# Patient Record
Sex: FEMALE | Race: WHITE | NOT HISPANIC OR LATINO | URBAN - METROPOLITAN AREA
[De-identification: names, ages, dates, MRNs, and addresses within clinical notes are randomized per-mention and may not be internally consistent; named-entity substitution may affect disease eponyms.]

---

## 2024-02-06 ENCOUNTER — APPOINTMENT (OUTPATIENT)
Dept: URGENT CARE | Facility: MEDICAL CENTER | Age: 44
End: 2024-02-06

## 2024-02-06 ENCOUNTER — APPOINTMENT (OUTPATIENT)
Dept: LAB | Facility: MEDICAL CENTER | Age: 44
End: 2024-02-06

## 2024-02-06 DIAGNOSIS — Z02.1 PHYSICAL EXAM, PRE-EMPLOYMENT: ICD-10-CM

## 2024-02-06 LAB
MEV IGG SER QL IA: NORMAL
MUV IGG SER QL IA: NORMAL
RUBV IGG SERPL IA-ACNC: 421.1 IU/ML
VZV IGG SER QL IA: NORMAL

## 2024-02-06 PROCEDURE — 86765 RUBEOLA ANTIBODY: CPT

## 2024-02-06 PROCEDURE — 36415 COLL VENOUS BLD VENIPUNCTURE: CPT

## 2024-02-06 PROCEDURE — 86787 VARICELLA-ZOSTER ANTIBODY: CPT

## 2024-02-06 PROCEDURE — 86735 MUMPS ANTIBODY: CPT

## 2024-02-06 PROCEDURE — 86762 RUBELLA ANTIBODY: CPT

## 2024-03-12 ENCOUNTER — HOSPITAL ENCOUNTER (EMERGENCY)
Facility: HOSPITAL | Age: 44
Discharge: HOME | End: 2024-03-12
Attending: EMERGENCY MEDICINE | Admitting: EMERGENCY MEDICINE
Payer: OTHER MISCELLANEOUS

## 2024-03-12 VITALS
RESPIRATION RATE: 16 BRPM | DIASTOLIC BLOOD PRESSURE: 84 MMHG | OXYGEN SATURATION: 100 % | HEART RATE: 71 BPM | HEIGHT: 62 IN | SYSTOLIC BLOOD PRESSURE: 123 MMHG | BODY MASS INDEX: 28.52 KG/M2 | TEMPERATURE: 98.1 F | WEIGHT: 155 LBS

## 2024-03-12 DIAGNOSIS — T26.92XA CHEMICAL INJURY OF LEFT EYE: Primary | ICD-10-CM

## 2024-03-12 PROCEDURE — 99283 EMERGENCY DEPT VISIT LOW MDM: CPT

## 2024-03-12 PROCEDURE — 99284 EMERGENCY DEPT VISIT MOD MDM: CPT | Performed by: EMERGENCY MEDICINE

## 2024-03-12 RX ORDER — TETRACAINE HYDROCHLORIDE 5 MG/ML
2 SOLUTION OPHTHALMIC ONCE
Status: COMPLETED | OUTPATIENT
Start: 2024-03-12 | End: 2024-03-12

## 2024-03-12 RX ORDER — IBUPROFEN 600 MG/1
600 TABLET ORAL ONCE
Status: COMPLETED | OUTPATIENT
Start: 2024-03-12 | End: 2024-03-12

## 2024-03-12 RX ORDER — ACETAMINOPHEN 325 MG/1
975 TABLET ORAL ONCE
Status: COMPLETED | OUTPATIENT
Start: 2024-03-12 | End: 2024-03-12

## 2024-03-12 RX ADMIN — IBUPROFEN 600 MG: 600 TABLET, FILM COATED ORAL at 13:06

## 2024-03-12 RX ADMIN — ACETAMINOPHEN 975 MG: 325 TABLET, FILM COATED ORAL at 13:06

## 2024-03-12 RX ADMIN — TETRACAINE HYDROCHLORIDE 2 DROP: 5 SOLUTION OPHTHALMIC at 13:05

## 2024-03-12 RX ADMIN — FLUORESCEIN SODIUM 1 STRIP: 1 STRIP OPHTHALMIC at 13:06

## 2024-03-12 NOTE — ED PROVIDER NOTES
History  Chief Complaint   Patient presents with    Chemical Exposure     Pt works for [employer] & got oxide in her L eye. Pt reports she flushed it out but it hurts more. 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  She reports that only splash directly into her left eye, did not affect any other part of her body.  She reports that she has pain in the eye, and she reports that she has blurry vision but her vision is blurry in that eye at baseline and she just does not wear corrective lenses.  Patient denies any fever, headache, dizziness,, vomiting, cough, shortness of breath, chest pain, or other complaints.  The patient has no other medical problems, takes no medications, no allergies, does not drink, smoke, or use drugs.        None       History reviewed. No pertinent past medical history.    Past Surgical History:   Procedure Laterality Date     SECTION      CHOLECYSTECTOMY         History reviewed. No pertinent family history.  I have reviewed and agree with the history as documented.    E-Cigarette/Vaping     E-Cigarette/Vaping Substances     Social History     Tobacco Use    Smoking status: Never    Smokeless tobacco: Never       Review of Systems   Constitutional:  Negative for fever.   HENT:  Negative for congestion.    Eyes:  Positive for pain. Negative for visual disturbance.   Respiratory:  Negative for cough and shortness of breath.    Cardiovascular:  Negative for chest pain.   Gastrointestinal:  Negative for abdominal pain, constipation, diarrhea, nausea and vomiting.   Endocrine: Negative for polyuria.   Genitourinary:  Negative for dysuria and hematuria.   Musculoskeletal:  Negative for myalgias.   Neurological:  Negative for dizziness and headaches.       Physical Exam  Physical Exam  Vitals and nursing note reviewed.   Constitutional:       General:  She is not in acute distress.     Appearance: Normal appearance. She is well-developed.   HENT:      Head: Normocephalic and atraumatic.   Eyes:      Extraocular Movements: Extraocular movements intact.      Conjunctiva/sclera: Conjunctivae normal.      Comments: Patient testing shows a left eye pH of approximately 6.75 prior to any treatment.   Cardiovascular:      Rate and Rhythm: Normal rate.   Pulmonary:      Effort: Pulmonary effort is normal. No respiratory distress.   Abdominal:      General: Abdomen is flat. There is no distension.   Musculoskeletal:         General: No swelling.      Cervical back: Neck supple.   Skin:     General: Skin is warm and dry.      Capillary Refill: Capillary refill takes less than 2 seconds.   Neurological:      General: No focal deficit present.      Mental Status: She is alert and oriented to person, place, and time.   Psychiatric:         Mood and Affect: Mood normal.         Vital Signs  ED Triage Vitals   Temperature Pulse Respirations Blood Pressure SpO2   03/12/24 1028 03/12/24 1028 03/12/24 1028 03/12/24 1028 03/12/24 1028   98.1 °F (36.7 °C) 78 16 136/85 100 %      Temp Source Heart Rate Source Patient Position - Orthostatic VS BP Location FiO2 (%)   03/12/24 1028 03/12/24 1028 03/12/24 1028 03/12/24 1028 --   Oral Monitor Sitting Right arm       Pain Score       03/12/24 1306       5           Vitals:    03/12/24 1028 03/12/24 1449   BP: 136/85 123/84   Pulse: 78 71   Patient Position - Orthostatic VS: Sitting Sitting         Visual Acuity  Visual Acuity      Flowsheet Row Most Recent Value   Visual acuity R eye is 20/20   Visual acuity Left eye is 20/20   Visual acuity in both eyes is 20/20            ED Medications  Medications   fluorescein sodium sterile ophthalmic strip 1 strip (1 strip Left Eye Given by Other 3/12/24 1306)   tetracaine 0.5 % ophthalmic solution 2 drop (2 drops Left Eye Given by Other 3/12/24 1305)   acetaminophen (TYLENOL) tablet 975 mg (975 mg  Oral Given 3/12/24 1306)   ibuprofen (MOTRIN) tablet 600 mg (600 mg Oral Given 3/12/24 1306)       Diagnostic Studies  Results Reviewed       None                   No orders to display              Procedures  Procedures         ED Course       Medical Decision Making  Patient's eye was flushed with a Jaylon lens for her eye pain, and she was evaluated for possible corneal abrasion, ulceration, my high suspicion is the patient has chemical conjunctivitis.    After evaluation the patient's cornea is intact with no signs of fluorescein uptake and no evidence of foreign body under the lids, she will be discharged home and will be encouraged to follow-up with ophthalmology as an outpatient.  Repeat pH testing shows a pH of approximately 7.0.    Risk  OTC drugs.  Prescription drug management.             Disposition  Final diagnoses:   Chemical injury of left eye     Time reflects when diagnosis was documented in both MDM as applicable and the Disposition within this note       Time User Action Codes Description Comment    3/12/2024  2:26 PM Jitendra Cerna Add [T26.92XA] Chemical injury of left eye           ED Disposition       ED Disposition   Discharge    Condition   Stable    Date/Time   Tue Mar 12, 2024  2:26 PM    Comment   Evelyn Yeh discharge to home/self care.                   Follow-up Information       Follow up With Specialties Details Why Contact Info Additional Information    [facility] Emergency Department Emergency Medicine Go to  As needed [address] [facility] [id number] [phone] [facility] Emergency Department, [address] [place], [place], [address]    [facility] Family Medicine Schedule an appointment as soon as possible for a visit in 3 days For follow-up [address]  [facility] [address] [phone] [facility], [address], [place],  [address]   [phone]    Erik Zhu MD Ophthalmology Schedule an appointment as soon as possible for a visit in 1 day For follow-up [facility] [id number] [phone]               There are no discharge medications for this patient.      No discharge procedures on file.    PDMP Review       None            ED Provider  Electronically Signed by             Jitendra Cerna MD  03/15/24 [id number]

## 2024-03-26 ENCOUNTER — APPOINTMENT (OUTPATIENT)
Dept: URGENT CARE | Facility: MEDICAL CENTER | Age: 44
End: 2024-03-26

## 2024-06-04 ENCOUNTER — OFFICE VISIT (OUTPATIENT)
Dept: URGENT CARE | Facility: MEDICAL CENTER | Age: 44
End: 2024-06-04
Payer: COMMERCIAL

## 2024-06-04 VITALS
SYSTOLIC BLOOD PRESSURE: 127 MMHG | TEMPERATURE: 98.9 F | BODY MASS INDEX: 29.56 KG/M2 | HEART RATE: 79 BPM | RESPIRATION RATE: 20 BRPM | WEIGHT: 161.6 LBS | DIASTOLIC BLOOD PRESSURE: 70 MMHG

## 2024-06-04 DIAGNOSIS — M62.838 MUSCLE SPASM: Primary | ICD-10-CM

## 2024-06-04 PROCEDURE — 99213 OFFICE O/P EST LOW 20 MIN: CPT

## 2024-06-04 RX ORDER — METHOCARBAMOL 500 MG/1
500 TABLET, FILM COATED ORAL 4 TIMES DAILY
Qty: 20 TABLET | Refills: 0 | Status: SHIPPED | OUTPATIENT
Start: 2024-06-04

## 2024-06-04 NOTE — PATIENT INSTRUCTIONS
Gentle stretches as tolerated   Heat and/or ice for 20 minutes at a time to site of pain  Ibuprofen and Tylenol for pain as needed   Lidocaine patches to area   Massage  Robaxin as prescribed    Follow up with PCP in 3-5 days   Proceed to ER if worsening symptoms

## 2024-06-04 NOTE — PROGRESS NOTES
[facility]        NAME: Evelyn Yeh is a 43 y.o. female  : 1980    MRN: [id number]  DATE: 2024  TIME: 4:32 PM    Assessment and Plan   Muscle spasm [M62.838]  1. Muscle spasm  methocarbamol (ROBAXIN) 500 mg tablet        Muscle spasm to R thoracic back. Conservative management. Can consider PT if not improving.    Patient Instructions     Gentle stretches as tolerated   Heat and/or ice for 20 minutes at a time to site of pain  Ibuprofen and Tylenol for pain as needed   Lidocaine patches to area   Massage  Robaxin as prescribed    Follow up with PCP in 3-5 days   Proceed to ER if worsening symptoms     If tests are performed, our office will contact you with results only if changes need to made to the care plan discussed with you at the visit. [facility]    Chief Complaint     Chief Complaint   Patient presents with    Back Pain     Pt c/o upper back pain. States pain is worse when lyng down. Pain does not radiate per patient.          History of Present Illness       Back Pain  This is a new problem. The current episode started 1 to 4 weeks ago. The problem is unchanged. The pain is present in the thoracic spine. The quality of the pain is described as cramping and aching. The pain does not radiate. The pain is Worse during the night. The symptoms are aggravated by lying down and bending. Pertinent negatives include no abdominal pain, chest pain, dysuria, fever, headaches, leg pain, numbness, paresis, pelvic pain, tingling or weakness. She has tried NSAIDs for the symptoms. The treatment provided mild relief.       Review of Systems   Review of Systems   Constitutional:  Negative for chills and fever.   HENT:  Negative for congestion, postnasal drip, rhinorrhea, sinus pressure, sore throat and trouble swallowing.    Respiratory:  Negative for cough, chest tightness and shortness of breath.    Cardiovascular:  Negative for chest pain and  palpitations.   Gastrointestinal:  Negative for abdominal pain, nausea and vomiting.   Genitourinary:  Negative for difficulty urinating, dysuria and pelvic pain.   Musculoskeletal:  Positive for back pain. Negative for myalgias.   Neurological:  Negative for dizziness, tingling, weakness, numbness and headaches.         Current Medications       Current Outpatient Medications:     methocarbamol (ROBAXIN) 500 mg tablet, Take 1 tablet (500 mg total) by mouth 4 (four) times a day, Disp: 20 tablet, Rfl: 0    Current Allergies     Allergies as of 2024    (No Known Allergies)            The following portions of the patient's history were reviewed and updated as appropriate: allergies, current medications, past family history, past medical history, past social history, past surgical history and problem list.     History reviewed. No pertinent past medical history.    Past Surgical History:   Procedure Laterality Date     SECTION      CHOLECYSTECTOMY         History reviewed. No pertinent family history.      Medications have been verified.        Objective   /70   Pulse 79   Temp 98.9 °F (37.2 °C)   Resp 20   Wt 73.3 kg (161 lb 9.6 oz)   BMI 29.56 kg/m²        Physical Exam     Physical Exam  Constitutional:       General: She is not in acute distress.  HENT:      Head: Normocephalic.      Nose: Nose normal.   Eyes:      Pupils: Pupils are equal, round, and reactive to light.   Cardiovascular:      Rate and Rhythm: Normal rate and regular rhythm.      Pulses: Normal pulses.      Heart sounds: Normal heart sounds.   Pulmonary:      Effort: Pulmonary effort is normal.      Breath sounds: Normal breath sounds.   Abdominal:      General: Abdomen is flat.   Musculoskeletal:         General: Normal range of motion.      Cervical back: Normal.      Thoracic back: Spasms and tenderness present. No swelling, edema, deformity, signs of trauma, lacerations or bony tenderness. Normal range of motion.       Lumbar back: Normal.        Back:    Skin:     General: Skin is warm and dry.      Capillary Refill: Capillary refill takes less than 2 seconds.   Neurological:      Mental Status: She is alert and oriented to person, place, and time.

## 2024-06-24 ENCOUNTER — APPOINTMENT (OUTPATIENT)
Dept: LAB | Facility: CLINIC | Age: 44
End: 2024-06-24

## 2024-06-24 DIAGNOSIS — Z00.8 HEALTH EXAMINATION IN POPULATION SURVEY: ICD-10-CM

## 2024-06-24 LAB
CHOLEST SERPL-MCNC: 186 MG/DL
EST. AVERAGE GLUCOSE BLD GHB EST-MCNC: 114 MG/DL
HBA1C MFR BLD: 5.6 %
HDLC SERPL-MCNC: 51 MG/DL
LDLC SERPL CALC-MCNC: 90 MG/DL (ref 0–100)
NONHDLC SERPL-MCNC: 135 MG/DL
TRIGL SERPL-MCNC: 226 MG/DL

## 2024-06-24 PROCEDURE — 36415 COLL VENOUS BLD VENIPUNCTURE: CPT

## 2024-06-24 PROCEDURE — 80061 LIPID PANEL: CPT

## 2024-06-24 PROCEDURE — 83036 HEMOGLOBIN GLYCOSYLATED A1C: CPT

## 2024-06-26 ENCOUNTER — OFFICE VISIT (OUTPATIENT)
Dept: FAMILY MEDICINE CLINIC | Facility: CLINIC | Age: 44
End: 2024-06-26
Payer: COMMERCIAL

## 2024-06-26 VITALS
OXYGEN SATURATION: 98 % | WEIGHT: 158 LBS | BODY MASS INDEX: 29.08 KG/M2 | DIASTOLIC BLOOD PRESSURE: 74 MMHG | TEMPERATURE: 97.6 F | HEART RATE: 74 BPM | HEIGHT: 62 IN | SYSTOLIC BLOOD PRESSURE: 112 MMHG | RESPIRATION RATE: 16 BRPM

## 2024-06-26 DIAGNOSIS — K63.9 BOWEL TROUBLE: ICD-10-CM

## 2024-06-26 DIAGNOSIS — Z13.1 SCREENING FOR DIABETES MELLITUS: ICD-10-CM

## 2024-06-26 DIAGNOSIS — Z00.00 ANNUAL PHYSICAL EXAM: ICD-10-CM

## 2024-06-26 DIAGNOSIS — Z00.00 WELL ADULT EXAM: Primary | ICD-10-CM

## 2024-06-26 DIAGNOSIS — Z12.4 SCREENING FOR CERVICAL CANCER: ICD-10-CM

## 2024-06-26 DIAGNOSIS — L83 ACANTHOSIS NIGRICANS: ICD-10-CM

## 2024-06-26 DIAGNOSIS — Z13.0 SCREENING FOR DEFICIENCY ANEMIA: ICD-10-CM

## 2024-06-26 DIAGNOSIS — M89.8X1 PAIN OF RIGHT SCAPULA: ICD-10-CM

## 2024-06-26 DIAGNOSIS — Z13.29 SCREENING FOR THYROID DISORDER: ICD-10-CM

## 2024-06-26 DIAGNOSIS — Z12.31 ENCOUNTER FOR SCREENING MAMMOGRAM FOR MALIGNANT NEOPLASM OF BREAST: ICD-10-CM

## 2024-06-26 PROCEDURE — 99386 PREV VISIT NEW AGE 40-64: CPT | Performed by: PHYSICIAN ASSISTANT

## 2024-06-26 PROCEDURE — 99203 OFFICE O/P NEW LOW 30 MIN: CPT | Performed by: PHYSICIAN ASSISTANT

## 2024-06-26 RX ORDER — PREDNISONE 10 MG/1
10 TABLET ORAL 2 TIMES DAILY WITH MEALS
Qty: 10 TABLET | Refills: 0 | Status: SHIPPED | OUTPATIENT
Start: 2024-06-26

## 2024-06-26 RX ORDER — WHEAT DEXTRIN 3 G/3.8 G
POWDER (GRAM) ORAL DAILY
Start: 2024-06-26

## 2024-06-26 NOTE — PROGRESS NOTES
Adult Annual Physical  Name: Evelyn Yeh      : 1980      MRN: [id number]  Encounter Provider: Jackie Willoughby PA-C  Encounter Date: 2024   Encounter department: [facility]    Assessment & Plan   1. Well adult exam  2. Encounter for screening mammogram for malignant neoplasm of breast  -     Mammo screening bilateral w 3d & cad; Future  3. Screening for cervical cancer  -     Ambulatory Referral to Obstetrics / Gynecology; Future  4. Screening for thyroid disorder  -     TSH, 3rd generation with Free T4 reflex; Future  5. Screening for deficiency anemia  -     CBC and differential  6. Screening for diabetes mellitus  -     Comprehensive metabolic panel  7. Acanthosis nigricans  Comments:  Low-carb low sugar diet  8. Pain of right scapula  Comments:  [facility]  Orders:  -     predniSONE 10 mg tablet; Take 1 tablet (10 mg total) by mouth 2 (two) times a day with meals  9. Bowel trouble  Comments:  Add over-the-counter Benefiber  Orders:  -     Wheat Dextrin (Benefiber) POWD; Take by mouth daily  10. Annual physical exam    Immunizations and preventive care screenings were discussed with patient today. Appropriate education was printed on patient's after visit summary.    Counseling:  Exercise: the importance of regular exercise/physical activity was discussed. Recommend exercise 3-5 times per week for at least 30 minutes.       Depression Screening and Follow-up Plan: Patient was screened for depression during today's encounter. They screened negative with a PHQ-2 score of 0.    Patient presents as a new patient to establish.  She needs a physical.  Is also having several issues.  We had her screening for diabetes and cholesterol done.  Hemoglobin A1c is normal.  Lipid panel shows elevated triglycerides at 226.  On exam patient has acanthosis migrans.  Michelle low-carb low sugar diet and exercise.  Patient states her grandmother may have had diabetes.  Patient also has been having right  scapular pain.  Exam she has pain underneath the scapular region.  Is been present for a month.  No specific injury.  Relaxers no relief.  They also make her drowsy.  Takes Aleve with some relief.  Do a short course of prednisone.  Patient also has bowel issues.  Patient states she has intermittent constipation and diarrhea.  Have constipation.  Then have a bowel movement which is followed by 6 or more diarrhea episodes.  Michelle adding fiber every day.  Will also holding dairy to see if this affects her.  Labs were ordered.         History of Present Illness     Adult Annual Physical:  Patient presents for annual physical.     Diet and Physical Activity:  - Diet/Nutrition: well balanced diet.  - Exercise: no formal exercise.    Depression Screening:  - PHQ-2 Score: 0    General Health:  - Sleep: 7-8 hours of sleep on average.  - Hearing: normal hearing bilateral ears.  - Vision: wears glasses and most recent eye exam < 1 year ago.  - Dental: regular dental visits.    /GYN Health:  - Follows with GYN: no.   - Menopause: premenopausal.   - History of STDs: no  - Contraception:. tubal   ligation      Advanced Care Planning:  - Has an advanced directive?: no    - Has a durable medical POA?: no    - ACP document given to patient?: no      Review of Systems   Constitutional:  Negative for activity change, appetite change and unexpected weight change.   HENT:  Negative for ear pain and sore throat.    Eyes:  Negative for visual disturbance.   Respiratory:  Negative for cough, shortness of breath and wheezing.    Cardiovascular:  Negative for chest pain, palpitations and leg swelling.   Gastrointestinal:  Positive for constipation and diarrhea. Negative for abdominal pain, blood in stool, nausea and vomiting.        Bloating.   No   heartburn   Genitourinary:  Negative for difficulty urinating and menstrual problem.   Musculoskeletal:  Positive for back pain. Negative for arthralgias and myalgias.        Back  pain   for    "one  month   r ight   scapular  region.  Worse   when  lying.    Skin:  Negative for rash.        Itchy   ears   Neurological:  Negative for dizziness, syncope, light-headedness and headaches.   Psychiatric/Behavioral:  Negative for self-injury, sleep disturbance and suicidal ideas. The patient is not nervous/anxious.      Current Outpatient Medications on File Prior to Visit   Medication Sig Dispense Refill    [DISCONTINUED] methocarbamol (ROBAXIN) 500 mg tablet Take 1 tablet (500 mg total) by mouth 4 (four) times a day (Patient not taking: Reported on 6/26/2024) 20 tablet 0     No current facility-administered medications on file prior to visit.        Objective     /74 (BP Location: Right arm, Patient Position: Sitting, Cuff Size: Large)   Pulse 74   Temp 97.6 °F (36.4 °C) (Temporal)   Resp 16   Ht 5' 2\" (1.575 m)   Wt 71.7 kg (158 lb)   LMP 06/14/2024   SpO2 98%   BMI 28.90 kg/m²     Physical Exam  Vitals and nursing note reviewed.   Constitutional:       General: She is not in acute distress.     Appearance: Normal appearance. She is well-developed. She is not ill-appearing or diaphoretic.   HENT:      Head: Normocephalic and atraumatic.      Right Ear: Tympanic membrane, ear canal and external ear normal.      Left Ear: Tympanic membrane, ear canal and external ear normal.      Mouth/Throat:      Pharynx: No posterior oropharyngeal erythema.   Eyes:      Extraocular Movements: Extraocular movements intact.      Conjunctiva/sclera: Conjunctivae normal.      Pupils: Pupils are equal, round, and reactive to light.   Neck:      Thyroid: No thyromegaly.      Vascular: No carotid bruit.   Cardiovascular:      Rate and Rhythm: Normal rate and regular rhythm.      Heart sounds: Normal heart sounds. No murmur heard.     No friction rub.   Pulmonary:      Effort: Pulmonary effort is normal. No respiratory distress.      Breath sounds: Normal breath sounds. No wheezing.   Abdominal:      General: Bowel " sounds are normal. There is no distension.      Palpations: Abdomen is soft. There is no mass.      Tenderness: There is no abdominal tenderness.   Musculoskeletal:      Right lower leg: No edema.      Left lower leg: No edema.      Comments: Patient has tenderness of her right scapula.   Lymphadenopathy:      Cervical: No cervical adenopathy.   Skin:     General: Skin is warm and dry.      Comments: Acanthoses   nigrans   Neurological:      General: No focal deficit present.      Mental Status: She is alert and oriented to person, place, and time.   Psychiatric:         Mood and Affect: Mood normal.         Behavior: Behavior normal.         Thought Content: Thought content normal.         Judgment: Judgment normal.       Administrative Statements

## 2024-07-17 ENCOUNTER — OFFICE VISIT (OUTPATIENT)
Dept: URGENT CARE | Facility: MEDICAL CENTER | Age: 44
End: 2024-07-17
Payer: COMMERCIAL

## 2024-07-17 VITALS
DIASTOLIC BLOOD PRESSURE: 83 MMHG | TEMPERATURE: 98.1 F | RESPIRATION RATE: 18 BRPM | OXYGEN SATURATION: 98 % | HEART RATE: 72 BPM | BODY MASS INDEX: 29.26 KG/M2 | WEIGHT: 160 LBS | SYSTOLIC BLOOD PRESSURE: 122 MMHG

## 2024-07-17 DIAGNOSIS — L24.9 IRRITANT CONTACT DERMATITIS, UNSPECIFIED TRIGGER: Primary | ICD-10-CM

## 2024-07-17 PROCEDURE — 99213 OFFICE O/P EST LOW 20 MIN: CPT

## 2024-07-17 RX ORDER — METHYLPREDNISOLONE 4 MG/1
TABLET ORAL
Qty: 21 TABLET | Refills: 0 | Status: SHIPPED | OUTPATIENT
Start: 2024-07-17

## 2024-07-17 NOTE — PATIENT INSTRUCTIONS
Use benadryl cream to the area as needed for itching  Take the steroid dose pack as prescribed  Use topical hydrocortisone cream

## 2024-07-17 NOTE — PROGRESS NOTES
[facility]        NAME: Evelyn Yeh is a 44 y.o. female  : 1980    MRN: [id number]  DATE: 2024  TIME: 4:39 PM    Assessment and Plan   Irritant contact dermatitis, unspecified trigger [L24.9]  1. Irritant contact dermatitis, unspecified trigger  methylPREDNISolone 4 MG tablet therapy pack            Patient Instructions       Follow up with PCP in 3-5 days.  PUse benadryl cream to the area as needed for itching  Take the steroid dose pack as prescribed  Use topical hydrocortisone creamroceed to  ER if symptoms worsen.    If tests have been performed at [facility], our office will contact you with results if changes need to be made to the care plan discussed with you at the visit.  [facility]    Chief Complaint     Chief Complaint   Patient presents with    Rash     Pt. With a rash to her right arm that began a couple days ago.          History of Present Illness       This is a 44 year old female who presents with rash and itching on the R wrist and forearm.  Patient states she was working in the yard and developed the rash approximately a week ago.  She has been using hydrocortisone cream and benadryl cream. She states it is getting worse.  Areas are at the R wrist and R forearm, and R AC.          Review of Systems   Review of Systems   Constitutional: Negative.    HENT: Negative.     Respiratory: Negative.     Cardiovascular: Negative.    Gastrointestinal: Negative.    Genitourinary: Negative.    Skin:  Positive for rash (R AC, R wrist, R forearm).   Neurological: Negative.          Current Medications       Current Outpatient Medications:     methylPREDNISolone 4 MG tablet therapy pack, Use as directed on package, Disp: 21 tablet, Rfl: 0    Wheat Dextrin (Benefiber) POWD, Take by mouth daily, Disp: , Rfl:     predniSONE 10 mg tablet, Take 1 tablet (10 mg total) by mouth 2 (two) times a day with meals (Patient not taking: Reported on  2024), Disp: 10 tablet, Rfl: 0    Current Allergies     Allergies as of 2024    (No Known Allergies)            The following portions of the patient's history were reviewed and updated as appropriate: allergies, current medications, past family history, past medical history, past social history, past surgical history and problem list.     History reviewed. No pertinent past medical history.    Past Surgical History:   Procedure Laterality Date     SECTION      CHOLECYSTECTOMY         Family History   Problem Relation Age of Onset    Hypertension Mother          Medications have been verified.        Objective   /83   Pulse 72   Temp 98.1 °F (36.7 °C)   Resp 18   Wt 72.6 kg (160 lb)   LMP 2024   SpO2 98%   BMI 29.26 kg/m²   Patient's last menstrual period was 2024.       Physical Exam     Physical Exam  Constitutional:       Appearance: Normal appearance.   HENT:      Head: Normocephalic and atraumatic.      Right Ear: Tympanic membrane normal.      Left Ear: Tympanic membrane normal.      Nose: Nose normal.      Mouth/Throat:      Mouth: Mucous membranes are moist.      Pharynx: Oropharynx is clear.   Eyes:      Extraocular Movements: Extraocular movements intact.      Pupils: Pupils are equal, round, and reactive to light.   Cardiovascular:      Rate and Rhythm: Normal rate and regular rhythm.      Pulses: Normal pulses.      Heart sounds: Normal heart sounds.   Pulmonary:      Effort: Pulmonary effort is normal.      Breath sounds: Normal breath sounds.   Musculoskeletal:        Arms:       Cervical back: Normal range of motion and neck supple.      Comments: Small areas on R wrist and R AC and forearm   Skin:     General: Skin is warm.      Capillary Refill: Capillary refill takes less than 2 seconds.      Findings: Rash present.   Neurological:      General: No focal deficit present.      Mental Status: She is alert and oriented to person, place, and time.    Psychiatric:         Mood and Affect: Mood normal.         Thought Content: Thought content normal.         Judgment: Judgment normal.

## 2024-07-29 ENCOUNTER — OFFICE VISIT (OUTPATIENT)
Dept: URGENT CARE | Facility: MEDICAL CENTER | Age: 44
End: 2024-07-29
Payer: COMMERCIAL

## 2024-07-29 VITALS
SYSTOLIC BLOOD PRESSURE: 122 MMHG | OXYGEN SATURATION: 98 % | HEIGHT: 62 IN | WEIGHT: 160 LBS | HEART RATE: 80 BPM | RESPIRATION RATE: 20 BRPM | DIASTOLIC BLOOD PRESSURE: 72 MMHG | BODY MASS INDEX: 29.44 KG/M2 | TEMPERATURE: 98.9 F

## 2024-07-29 DIAGNOSIS — L30.9 DERMATITIS: Primary | ICD-10-CM

## 2024-07-29 PROCEDURE — 99213 OFFICE O/P EST LOW 20 MIN: CPT | Performed by: PHYSICIAN ASSISTANT

## 2024-07-29 RX ORDER — METHYLPREDNISOLONE 4 MG/1
TABLET ORAL
Qty: 21 TABLET | Refills: 0 | Status: SHIPPED | OUTPATIENT
Start: 2024-07-29

## 2024-07-29 NOTE — PROGRESS NOTES
[facility] Now        NAME: Evelyn Yeh is a 44 y.o. female  : 1980    MRN: [id number]  DATE: 2024  TIME: 10:56 AM    Assessment and Plan   Dermatitis [L30.9]  1. Dermatitis              Patient Instructions     Dermatitis  Medrol dose pack as directed  Follow up with dermatology  Follow up with PCP in 3-5 days.  Proceed to  ER if symptoms worsen.    Chief Complaint     Chief Complaint   Patient presents with    Rash     Patient states she was treated for dermatitis last week (finished course of steroids) and now rash is still spreading to the body, neck, and face          History of Present Illness       44-year-old female who presents complaining of itchy rash to bilateral upper extremities and neck.  Patient states that she was recently treated for poison ivy with steroids.  The rash had decreased but has now returned.  Denies swelling of lips/tongue, difficulty swallowing, shortness of breath.    Rash  Pertinent negatives include no cough or shortness of breath.       Review of Systems   Review of Systems   Constitutional: Negative.    HENT: Negative.     Eyes: Negative.    Respiratory: Negative.  Negative for cough, chest tightness, shortness of breath, wheezing and stridor.    Cardiovascular: Negative.  Negative for chest pain, palpitations and leg swelling.   Skin:  Positive for rash.         Current Medications       Current Outpatient Medications:     Wheat Dextrin (Benefiber) POWD, Take by mouth daily, Disp: , Rfl:     methylPREDNISolone 4 MG tablet therapy pack, Use as directed on package, Disp: 21 tablet, Rfl: 0    predniSONE 10 mg tablet, Take 1 tablet (10 mg total) by mouth 2 (two) times a day with meals (Patient not taking: Reported on 2024), Disp: 10 tablet, Rfl: 0    Current Allergies     Allergies as of 2024    (No Known Allergies)            The following portions of the patient's history were reviewed and updated as appropriate: allergies, current  "medications, past family history, past medical history, past social history, past surgical history and problem list.     History reviewed. No pertinent past medical history.    Past Surgical History:   Procedure Laterality Date     SECTION      CHOLECYSTECTOMY         Family History   Problem Relation Age of Onset    Hypertension Mother          Medications have been verified.        Objective   /72   Pulse 80   Temp 98.9 °F (37.2 °C) (Temporal)   Resp 20   Ht 5' 2\" (1.575 m)   Wt 72.6 kg (160 lb)   SpO2 98%   BMI 29.26 kg/m²        Physical Exam     Physical Exam  Constitutional:       Appearance: Normal appearance. She is well-developed.   HENT:      Head: Normocephalic and atraumatic.      Right Ear: External ear normal.      Left Ear: External ear normal.      Mouth/Throat:      Mouth: Oropharynx is clear and moist. Mucous membranes are moist.      Pharynx: Oropharynx is clear. No oropharyngeal exudate or posterior oropharyngeal erythema.   Cardiovascular:      Rate and Rhythm: Normal rate and regular rhythm.      Heart sounds: Normal heart sounds.   Pulmonary:      Effort: Pulmonary effort is normal. No respiratory distress.      Breath sounds: Normal breath sounds. No stridor. No wheezing, rhonchi or rales.   Chest:      Chest wall: No tenderness.   Musculoskeletal:      Cervical back: Normal range of motion and neck supple.   Lymphadenopathy:      Cervical: No cervical adenopathy.   Neurological:      Mental Status: She is alert.                   "

## 2024-07-29 NOTE — PATIENT INSTRUCTIONS
Dermatitis  Medrol dose pack as directed  Follow up with dermatology  Follow up with PCP in 3-5 days.  Proceed to  ER if symptoms worsen.

## 2024-09-06 ENCOUNTER — OFFICE VISIT (OUTPATIENT)
Dept: URGENT CARE | Facility: CLINIC | Age: 44
End: 2024-09-06
Payer: COMMERCIAL

## 2024-09-06 VITALS
SYSTOLIC BLOOD PRESSURE: 138 MMHG | DIASTOLIC BLOOD PRESSURE: 74 MMHG | TEMPERATURE: 98.1 F | OXYGEN SATURATION: 100 % | RESPIRATION RATE: 18 BRPM | HEART RATE: 72 BPM
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
## 2024-09-06 DIAGNOSIS — R22.42 LOCALIZED SWELLING OF LEFT FOOT: Primary | ICD-10-CM

## 2024-09-06 DIAGNOSIS — S90.862A INSECT BITE OF LEFT FOOT, INITIAL ENCOUNTER: ICD-10-CM

## 2024-09-06 PROBLEM — R51.9 CHRONIC RIGHT-SIDED HEADACHES: Status: ACTIVE | Noted: 2023-08-04

## 2024-09-06 PROBLEM — G89.29 CHRONIC RIGHT-SIDED HEADACHES: Status: ACTIVE | Noted: 2023-08-04

## 2024-09-06 PROCEDURE — 99213 OFFICE O/P EST LOW 20 MIN: CPT

## 2024-09-06 RX ORDER — TRIAMCINOLONE ACETONIDE 0.25 MG/G
CREAM TOPICAL 2 TIMES DAILY
Qty: 30 G | Refills: 0 | Status: SHIPPED | OUTPATIENT
Start: 2024-09-06

## 2024-09-06 NOTE — LETTER
September 6, 2024     Patient: Evelyn Yeh   YOB: 1980   Date of Visit: 9/6/2024       To Whom it May Concern:    Evelyn Yeh was seen in my clinic on 9/6/2024. She should be excused 9/7/24.     If you have any questions or concerns, please don't hesitate to call.         Sincerely,          ANDREA Almanza        CC: No Recipients

## 2024-09-06 NOTE — PATIENT INSTRUCTIONS
Start on pill antihistamine for 2-3 days : allergra or claritin  Take the steroid cream for the next 2-3 days twice a day.  Follow up is swelling worsens, fever or chills, increased redness, fever or chills

## 2024-09-06 NOTE — PROGRESS NOTES
[facility] Now        NAME: Evelyn Yeh is a 44 y.o. female  : 1980    MRN: [id number]  DATE: 2024  TIME: 6:26 PM    Assessment and Plan   Localized swelling of left foot [R22.42]  1. Localized swelling of left foot  triamcinolone (KENALOG) 0.025 % cream        Suspected bug bite to toe causing allergic reaction. Start on steroid cream and antihistamines to the site. With ice/epsom salt. No indications for trauma, does not appear infected, does not fit story of stress fracture with itchiness sensation.   Left elbow appears to be improving, can use steroid cream to the site.   Follow up with primary care in 3-5 days.  Go to ER if symptoms get worse.     Patient Instructions     Start on steroid cream and antihistamines to the site. With ice/epsom salt.   Left elbow appears to be improving, can use steroid cream to the site.   Go see your regular family doctor in 3-5 days.  Go to emergency room (ER) if you are getting worse.     Chief Complaint     Chief Complaint   Patient presents with    Toe Pain       Pt with left great toe pain/redness/swelling since today. Was initially itchy. Reports toe feels numb and has tingling going up left leg. Used cortisone crea.     Elbow Pain     Pt with lump on left elbow skin lump x2 days. Thought maybe poison ivy. Used calamine lotion.          History of Present Illness       Presents with two rashes. First to the left elbow. Thought potentially poison ivy. Applied calamine lotion. The area was itchy but that has resolved. Now hard lump present. Then felt itchiness to the left big toe. Then the area began with pain/redness/swelling. Reports toe feels numb and has tingling going up left leg. Used cortisone cream without relief. Denies injury, trauma, gout history. Area no longer itchy. She is able to walk but it is painful.         Review of Systems   Review of Systems   Constitutional:  Negative for fever.   Respiratory:  Negative for shortness  of breath.    Cardiovascular:  Negative for chest pain.   Musculoskeletal:         Painful ambulation   Skin:  Positive for rash.   Neurological:  Positive for numbness.         Current Medications       Current Outpatient Medications:     triamcinolone (KENALOG) 0.025 % cream, Apply topically 2 (two) times a day, Disp: 30 g, Rfl: 0    Wheat Dextrin (Benefiber) POWD, Take by mouth daily, Disp: , Rfl:     methylPREDNISolone 4 MG tablet therapy pack, Use as directed on package (Patient not taking: Reported on 2024), Disp: 21 tablet, Rfl: 0    methylPREDNISolone 4 MG tablet therapy pack, Use as directed on package (Patient not taking: Reported on 2024), Disp: 21 tablet, Rfl: 0    predniSONE 10 mg tablet, Take 1 tablet (10 mg total) by mouth 2 (two) times a day with meals (Patient not taking: Reported on 2024), Disp: 10 tablet, Rfl: 0    Current Allergies     Allergies as of 2024    (No Known Allergies)            The following portions of the patient's history were reviewed and updated as appropriate: allergies, current medications, past family history, past medical history, past social history, past surgical history and problem list.     History reviewed. No pertinent past medical history.    Past Surgical History:   Procedure Laterality Date     SECTION      CHOLECYSTECTOMY         Family History   Problem Relation Age of Onset    Hypertension Mother          Medications have been verified.        Objective   /74   Pulse 72   Temp 98.1 °F (36.7 °C)   Resp 18   SpO2 100%        Physical Exam     Physical Exam  Vitals reviewed.   Constitutional:       Appearance: Normal appearance.   Cardiovascular:      Rate and Rhythm: Normal rate and regular rhythm.      Pulses: Normal pulses.      Heart sounds: Normal heart sounds. No murmur heard.  Pulmonary:      Effort: Pulmonary effort is normal. No respiratory distress.      Breath sounds: Normal breath sounds.   Feet:      Comments: Left  foot at base of toe with moderate swelling and erythema. Erythema is pale, puffy edema. No streaking. No warmth. Over the base of the top with deeper redness present, and mild open skin.   Left elbow with keloid hard area present about 1 cm x 0.25 cm, raised from skin. No localized erythema.   Skin:     General: Skin is warm and dry.      Capillary Refill: Capillary refill takes less than 2 seconds.   Neurological:      General: No focal deficit present.      Mental Status: She is alert and oriented to person, place, and time.   Psychiatric:         Mood and Affect: Mood normal.         Behavior: Behavior normal.

## 2024-09-06 NOTE — PROGRESS NOTES
Subjective:      Evelyn Yeh is a 44 y.o. female. Here for Gynecologic Exam (Pap collected today /Mammo 10/26/24 / scheduled /Birth control- patient has tubal /Std testing declined /Gardasil )      GYN HPI  Menstrual cycle:  Patient denies menstrual complaints. Regular monthly menses, not excessive  can sometimes be closer by a week.  Vaginal c/o: denies  Urinary c/o: denies  Breast complaints:denies  She does not do self breast Exams    Sexually active: yes,  x 22 yrs.   Denies c/o r/t Sexual activity   Contraception: TL  She reports she feels safe at home.     Dietary calcium/vit D  intake: adequate  Lifestyle: occasional exercises    HEALTH MAINTENANCE SCREENINGS:    Last Papanicolaou test:  Not on file   History of abnormal pap: No  Last mammogram:  10/20/2022      Hereditary Cancer Screening  Cancer-related family history is not on file.       Substance Abuse Screening Completed. See hx and flowsheet.    The following portions of the patient's history were reviewed and updated as appropriate: allergies, current medications, past family history, past medical history, past social history, past surgical history, and problem list.         Review of Systems   Constitutional:  Negative for appetite change, chills, fatigue, fever and unexpected weight change.   HENT: Negative.     Eyes: Negative.    Respiratory:  Negative for chest tightness and shortness of breath.    Cardiovascular:  Negative for chest pain and palpitations.   Gastrointestinal:  Negative for abdominal pain, constipation and vomiting.   Endocrine: Negative for cold intolerance and heat intolerance.   Genitourinary:         As per HPI   Musculoskeletal:  Negative for back pain, joint swelling and neck pain.   Skin:  Negative for color change and rash.   Neurological:  Negative for dizziness, weakness and numbness.   Hematological:  Does not bruise/bleed easily.   Psychiatric/Behavioral: Negative.               Objective:  /80 (BP  "Location: Left arm, Patient Position: Sitting, Cuff Size: Standard)   Ht 5' 2\" (1.575 m)   Wt 71.2 kg (157 lb)   LMP 09/02/2024 (Exact Date)   BMI 28.72 kg/m²        Physical Exam  Constitutional:       General: She is not in acute distress.     Appearance: Normal appearance.   Genitourinary:      Vulva and rectum normal.      No lesions in the vagina.      Right Labia: No rash or lesions.     Left Labia: No lesions or rash.     No vaginal discharge, erythema, tenderness or bleeding.        Right Adnexa: not tender and no mass present.     Left Adnexa: not tender and no mass present.     No cervical motion tenderness, discharge or friability.      Uterus is not enlarged or tender.      No urethral prolapse present.      Pelvic exam was performed with patient in the lithotomy position.   Breasts:     Breasts are symmetrical.      Right: No inverted nipple, mass, nipple discharge, skin change or tenderness.      Left: No inverted nipple, mass, nipple discharge, skin change or tenderness.   HENT:      Head: Normocephalic and atraumatic.   Cardiovascular:      Rate and Rhythm: Normal rate.      Heart sounds: No murmur heard.  Pulmonary:      Effort: Pulmonary effort is normal.      Breath sounds: Normal breath sounds.   Abdominal:      General: There is no distension.      Palpations: Abdomen is soft.      Tenderness: There is no abdominal tenderness.   Musculoskeletal:         General: Normal range of motion.      Cervical back: Normal range of motion.   Lymphadenopathy:      Cervical: No cervical adenopathy.   Neurological:      Mental Status: She is alert and oriented to person, place, and time.   Skin:     General: Skin is warm and dry.   Psychiatric:         Mood and Affect: Mood normal.         Behavior: Behavior normal.   Vitals reviewed.             Assessment/Plan:           ANNUAL GYN EXAM- Primary  Annual GYN examination completed today.   Health maintenance reviewed/updated as appropriate  Cervical " cancer screen: Previous pap smears and ASCCP screening guidelines have been reviewed. Pap collected.  Breast Health: Encouraged regular self breast exams. Mammo is scheduled for today. .    Risk prevention and anticipatory guidance provided including:  Age related Calcium and vitamin D intake  Dietary and lifestyle recommendations based on her age and weight. body mass index is 28.72 kg/m²..    Tobacco and alcohol use, intervention ordered if applicable.   Condom use for prevention of STI's. declined STI Screening.  Contraception:  N/A- s/p permanent sterilization surgery    Problem List Items Addressed This Visit    None  Visit Diagnoses       Encounter for gynecological examination (general) (routine) without abnormal findings    -  Primary    Screening for cervical cancer        Relevant Orders    Liquid-based pap, screening    Encounter for screening mammogram for breast cancer                No orders of the defined types were placed in this encounter.

## 2024-09-09 ENCOUNTER — HOSPITAL ENCOUNTER (OUTPATIENT)
Age: 44
Discharge: HOME | End: 2024-09-09
Payer: COMMERCIAL

## 2024-09-09 ENCOUNTER — CONSULT (OUTPATIENT)
Dept: OBGYN CLINIC | Facility: MEDICAL CENTER | Age: 44
End: 2024-09-09
Payer: COMMERCIAL

## 2024-09-09 VITALS
DIASTOLIC BLOOD PRESSURE: 80 MMHG | WEIGHT: 157 LBS | SYSTOLIC BLOOD PRESSURE: 118 MMHG | HEIGHT: 62 IN | BODY MASS INDEX: 28.89 KG/M2

## 2024-09-09 DIAGNOSIS — Z12.31 ENCOUNTER FOR SCREENING MAMMOGRAM FOR MALIGNANT NEOPLASM OF BREAST: ICD-10-CM

## 2024-09-09 DIAGNOSIS — Z12.4 SCREENING FOR CERVICAL CANCER: ICD-10-CM

## 2024-09-09 DIAGNOSIS — Z12.31 ENCOUNTER FOR SCREENING MAMMOGRAM FOR BREAST CANCER: ICD-10-CM

## 2024-09-09 DIAGNOSIS — Z01.419 ENCOUNTER FOR GYNECOLOGICAL EXAMINATION (GENERAL) (ROUTINE) WITHOUT ABNORMAL FINDINGS: Primary | ICD-10-CM

## 2024-09-09 PROCEDURE — 77063 BREAST TOMOSYNTHESIS BI: CPT

## 2024-09-09 PROCEDURE — S0610 ANNUAL GYNECOLOGICAL EXAMINA: HCPCS | Performed by: CLINICAL NURSE SPECIALIST

## 2024-09-09 PROCEDURE — 77067 SCR MAMMO BI INCL CAD: CPT

## 2024-09-09 PROCEDURE — G0476 HPV COMBO ASSAY CA SCREEN: HCPCS | Performed by: CLINICAL NURSE SPECIALIST

## 2024-09-09 PROCEDURE — G0145 SCR C/V CYTO,THINLAYER,RESCR: HCPCS | Performed by: CLINICAL NURSE SPECIALIST

## 2024-09-10 ENCOUNTER — TELEPHONE (OUTPATIENT)
Age: 44
End: 2024-09-10

## 2024-09-10 LAB
HPV HR 12 DNA CVX QL NAA+PROBE: NEGATIVE
HPV16 DNA CVX QL NAA+PROBE: NEGATIVE
HPV18 DNA CVX QL NAA+PROBE: NEGATIVE

## 2024-09-10 NOTE — TELEPHONE ENCOUNTER
Pt calling for NP appt for rash, advised next avail, pt wants to be seen ASAP, advised to call for cancellations periodically

## 2024-09-12 LAB
LAB AP GYN PRIMARY INTERPRETATION: NORMAL
Lab: NORMAL

## 2024-09-16 ENCOUNTER — OFFICE VISIT (OUTPATIENT)
Dept: URGENT CARE | Facility: MEDICAL CENTER | Age: 44
End: 2024-09-16
Payer: COMMERCIAL

## 2024-09-16 VITALS
TEMPERATURE: 97.8 F | DIASTOLIC BLOOD PRESSURE: 70 MMHG | HEIGHT: 62 IN | OXYGEN SATURATION: 99 % | BODY MASS INDEX: 29.74 KG/M2 | HEART RATE: 80 BPM | WEIGHT: 161.6 LBS | SYSTOLIC BLOOD PRESSURE: 125 MMHG | RESPIRATION RATE: 16 BRPM

## 2024-09-16 DIAGNOSIS — L23.7 POISON IVY DERMATITIS: Primary | ICD-10-CM

## 2024-09-16 PROCEDURE — 99213 OFFICE O/P EST LOW 20 MIN: CPT | Performed by: PHYSICIAN ASSISTANT

## 2024-09-16 RX ORDER — METHYLPREDNISOLONE 4 MG
TABLET, DOSE PACK ORAL
Qty: 21 TABLET | Refills: 0 | Status: SHIPPED | OUTPATIENT
Start: 2024-09-16

## 2024-09-16 RX ORDER — MOMETASONE FUROATE 1 MG/G
CREAM TOPICAL DAILY
Qty: 45 G | Refills: 0 | Status: SHIPPED | OUTPATIENT
Start: 2024-09-16

## 2024-09-16 NOTE — PROGRESS NOTES
[facility] Now        NAME: Evelyn Yeh is a 44 y.o. female  : 1980    MRN: [id number]  DATE: 2024  TIME: 4:37 PM    Assessment and Plan   Poison ivy dermatitis [L23.7]  1. Poison ivy dermatitis  methylPREDNISolone 4 MG tablet therapy pack    mometasone (ELOCON) 0.1 % cream            Patient Instructions     Poison ivy dermatitis  Medrol Dosepak as directed  Elocon cream as directed  Follow up with PCP in 3-5 days.  Proceed to  ER if symptoms worsen.    Chief Complaint     Chief Complaint   Patient presents with    Poison Ivy     Started with rash 2 days ago- b/l arms, back, torso, and neck. Calamine lotion as needed.         History of Present Illness       44-year-old female who presents complaining of itchy rash to bilateral upper extremities.  Patient states that she has had similar symptoms in the past with previous episodes of poison ivy.  Has been applying over-the-counter medication with no relief.    Poison Mary  Pertinent negatives include no anorexia, congestion, cough, diarrhea, eye pain, facial edema, fatigue, fever, joint pain, rhinorrhea, shortness of breath, sore throat or vomiting.   Rash  This is a recurrent problem. The current episode started yesterday. The problem has been rapidly worsening since onset. The affected locations include the left arm and right arm. The rash is characterized by blistering, burning, pain, redness, swelling, bruising and itchiness. Pertinent negatives include no anorexia, congestion, cough, diarrhea, facial edema, fatigue, fever, joint pain, rhinorrhea, shortness of breath, sore throat or vomiting.       Review of Systems   Review of Systems   Constitutional:  Negative for fatigue and fever.   HENT:  Negative for congestion, rhinorrhea and sore throat.    Eyes:  Negative for pain.   Respiratory:  Negative for cough and shortness of breath.    Gastrointestinal:  Negative for anorexia, diarrhea and vomiting.   Musculoskeletal:   "Negative for joint pain.   Skin:  Positive for rash.         Current Medications       Current Outpatient Medications:     methylPREDNISolone 4 MG tablet therapy pack, Use as directed on package, Disp: 21 tablet, Rfl: 0    mometasone (ELOCON) 0.1 % cream, Apply topically daily, Disp: 45 g, Rfl: 0    Wheat Dextrin (Benefiber) POWD, Take by mouth daily, Disp: , Rfl:     triamcinolone (KENALOG) 0.025 % cream, Apply topically 2 (two) times a day (Patient not taking: Reported on 2024), Disp: 30 g, Rfl: 0    Current Allergies     Allergies as of 2024    (No Known Allergies)            The following portions of the patient's history were reviewed and updated as appropriate: allergies, current medications, past family history, past medical history, past social history, past surgical history and problem list.     History reviewed. No pertinent past medical history.    Past Surgical History:   Procedure Laterality Date     SECTION      CHOLECYSTECTOMY      TUBAL LIGATION         Family History   Problem Relation Age of Onset    Hypertension Mother     No Known Problems Daughter     No Known Problems Maternal Grandmother     No Known Problems Paternal Grandmother     No Known Problems Paternal Aunt     Breast cancer Neg Hx          Medications have been verified.        Objective   /70   Pulse 80   Temp 97.8 °F (36.6 °C)   Resp 16   Ht 5' 2\" (1.575 m)   Wt 73.3 kg (161 lb 9.6 oz)   LMP 2024 (Exact Date)   SpO2 99%   BMI 29.56 kg/m²        Physical Exam     Physical Exam  Constitutional:       Appearance: Normal appearance. She is well-developed.   HENT:      Head: Normocephalic and atraumatic.      Right Ear: External ear normal.      Left Ear: External ear normal.      Mouth/Throat:      Mouth: Oropharynx is clear and moist.   Cardiovascular:      Rate and Rhythm: Normal rate and regular rhythm.      Heart sounds: Normal heart sounds.   Pulmonary:      Effort: Pulmonary effort is normal. " No respiratory distress.      Breath sounds: Normal breath sounds. No wheezing or rales.   Chest:      Chest wall: No tenderness.   Musculoskeletal:      Cervical back: Normal range of motion and neck supple.   Lymphadenopathy:      Cervical: No cervical adenopathy.   Skin:         Neurological:      Mental Status: She is alert.                   Answers submitted by the patient for this visit:  Rash Questionnaire (Submitted on 9/16/2024)  Chief Complaint: Rash  nail changes: No

## 2024-10-02 ENCOUNTER — OFFICE VISIT (OUTPATIENT)
Dept: FAMILY MEDICINE CLINIC | Facility: CLINIC | Age: 44
End: 2024-10-02
Payer: COMMERCIAL

## 2024-10-02 ENCOUNTER — APPOINTMENT (OUTPATIENT)
Dept: RADIOLOGY | Facility: MEDICAL CENTER | Age: 44
End: 2024-10-02
Payer: COMMERCIAL

## 2024-10-02 VITALS
OXYGEN SATURATION: 98 % | RESPIRATION RATE: 16 BRPM | TEMPERATURE: 98.3 F | WEIGHT: 160 LBS | SYSTOLIC BLOOD PRESSURE: 104 MMHG | HEART RATE: 76 BPM | HEIGHT: 62 IN | BODY MASS INDEX: 29.44 KG/M2 | DIASTOLIC BLOOD PRESSURE: 72 MMHG

## 2024-10-02 DIAGNOSIS — M54.6 ACUTE RIGHT-SIDED THORACIC BACK PAIN: ICD-10-CM

## 2024-10-02 DIAGNOSIS — M54.6 ACUTE RIGHT-SIDED THORACIC BACK PAIN: Primary | ICD-10-CM

## 2024-10-02 DIAGNOSIS — M89.8X1 PAIN OF RIGHT SCAPULA: ICD-10-CM

## 2024-10-02 PROCEDURE — 99213 OFFICE O/P EST LOW 20 MIN: CPT | Performed by: INTERNAL MEDICINE

## 2024-10-02 PROCEDURE — 71046 X-RAY EXAM CHEST 2 VIEWS: CPT

## 2024-10-02 PROCEDURE — 72072 X-RAY EXAM THORAC SPINE 3VWS: CPT

## 2024-10-02 RX ORDER — LIDOCAINE 40 MG/G
CREAM TOPICAL
Qty: 60 G | Refills: 1 | Status: SHIPPED | OUTPATIENT
Start: 2024-10-02

## 2024-10-02 RX ORDER — NAPROXEN 500 MG/1
500 TABLET ORAL 2 TIMES DAILY WITH MEALS
Qty: 60 TABLET | Refills: 5 | Status: SHIPPED | OUTPATIENT
Start: 2024-10-02

## 2024-10-02 NOTE — PROGRESS NOTES
Ambulatory Visit  Name: Evelyn Yeh      : 1980      MRN: [id number]  Encounter Provider: Trudy Negro MD  Encounter Date: 10/2/2024   Encounter department: [facility]    Assessment & Plan  Acute right-sided thoracic back pain    Orders:    XR spine thoracic 3 vw; Future    XR chest pa and lateral; Future    naproxen (Naprosyn) 500 mg tablet; Take 1 tablet (500 mg total) by mouth 2 (two) times a day with meals    lidocaine (LMX) 4 % cream; Apply topically daily at bedtime    Pain of right scapula  Patient complains of mid back pain near her spine and her scapula on the right. She denies any trauma shortness of breath cough fever chills will try steroid pack along with Naprosyn and get some x-rays            History of Present Illness     Back Pain  This is a new problem. The current episode started more than 1 month ago. The problem occurs daily. The problem is unchanged. The pain is present in the thoracic spine. The quality of the pain is described as stabbing and aching. The pain does not radiate. The pain is at a severity of 6/10. The pain is moderate. The pain is The same all the time. The symptoms are aggravated by lying down, position and twisting. Stiffness is present: none. Pertinent negatives include no abdominal pain, bowel incontinence, chest pain, dysuria, fever, numbness or paresthesias. Risk factors: none. She has tried analgesics and heat for the symptoms. The treatment provided no relief.         Review of Systems   Constitutional:  Negative for chills and fever.   HENT:  Negative for ear pain and sore throat.    Eyes:  Negative for pain and visual disturbance.   Respiratory:  Negative for cough, chest tightness, shortness of breath and wheezing.    Cardiovascular:  Negative for chest pain, palpitations and leg swelling.   Gastrointestinal:  Negative for abdominal pain, bowel incontinence and vomiting.   Genitourinary:  Negative for dysuria and hematuria.  "  Musculoskeletal:  Positive for back pain. Negative for arthralgias.   Skin:  Negative for color change and rash.   Neurological:  Negative for seizures, syncope, numbness and paresthesias.   All other systems reviewed and are negative.          Objective     /72 (BP Location: Left arm, Patient Position: Sitting, Cuff Size: Large)   Pulse 76   Temp 98.3 °F (36.8 °C) (Temporal)   Resp 16   Ht 5' 2\" (1.575 m)   Wt 72.6 kg (160 lb)   LMP 09/27/2024 (Approximate)   SpO2 98%   BMI 29.26 kg/m²     Physical Exam  Constitutional:       Appearance: Normal appearance.   HENT:      Head: Normocephalic.      Nose: Nose normal.      Mouth/Throat:      Mouth: Mucous membranes are moist.   Cardiovascular:      Rate and Rhythm: Normal rate and regular rhythm.   Pulmonary:      Effort: Pulmonary effort is normal.      Breath sounds: Normal breath sounds.   Abdominal:      General: Abdomen is flat.      Palpations: Abdomen is soft.   Musculoskeletal:      Cervical back: No rigidity or tenderness.      Right lower leg: No edema.      Left lower leg: No edema.   Lymphadenopathy:      Cervical: No cervical adenopathy.   Skin:     General: Skin is warm and dry.      Capillary Refill: Capillary refill takes less than 2 seconds.      Findings: No rash.   Neurological:      General: No focal deficit present.      Mental Status: She is alert and oriented to person, place, and time. Mental status is at baseline.   Psychiatric:         Mood and Affect: Mood normal.         Behavior: Behavior normal.         Thought Content: Thought content normal.         Judgment: Judgment normal.         "

## 2024-10-02 NOTE — ASSESSMENT & PLAN NOTE
Patient complains of mid back pain near her spine and her scapula on the right. She denies any trauma shortness of breath cough fever chills will try steroid pack along with Naprosyn and get some x-rays

## 2025-01-06 ENCOUNTER — OFFICE VISIT (OUTPATIENT)
Dept: URGENT CARE | Facility: CLINIC | Age: 45
End: 2025-01-06
Payer: COMMERCIAL

## 2025-01-06 VITALS
HEART RATE: 120 BPM | TEMPERATURE: 101.6 F | OXYGEN SATURATION: 96 % | RESPIRATION RATE: 18 BRPM | SYSTOLIC BLOOD PRESSURE: 140 MMHG | DIASTOLIC BLOOD PRESSURE: 80 MMHG

## 2025-01-06 DIAGNOSIS — J02.9 SORE THROAT: ICD-10-CM

## 2025-01-06 DIAGNOSIS — R68.89 FLU-LIKE SYMPTOMS: Primary | ICD-10-CM

## 2025-01-06 DIAGNOSIS — R05.1 ACUTE COUGH: ICD-10-CM

## 2025-01-06 DIAGNOSIS — R11.2 NAUSEA AND VOMITING, UNSPECIFIED VOMITING TYPE: ICD-10-CM

## 2025-01-06 DIAGNOSIS — R50.9 FEVER, UNSPECIFIED FEVER CAUSE: ICD-10-CM

## 2025-01-06 LAB — S PYO AG THROAT QL: NEGATIVE

## 2025-01-06 PROCEDURE — 87880 STREP A ASSAY W/OPTIC: CPT

## 2025-01-06 PROCEDURE — 99213 OFFICE O/P EST LOW 20 MIN: CPT

## 2025-01-06 PROCEDURE — 87636 SARSCOV2 & INF A&B AMP PRB: CPT

## 2025-01-06 RX ORDER — ONDANSETRON 4 MG/1
4 TABLET, ORALLY DISINTEGRATING ORAL ONCE
Status: COMPLETED | OUTPATIENT
Start: 2025-01-06 | End: 2025-01-06

## 2025-01-06 RX ORDER — BENZONATATE 200 MG/1
200 CAPSULE ORAL 3 TIMES DAILY PRN
Qty: 20 CAPSULE | Refills: 0 | Status: SHIPPED | OUTPATIENT
Start: 2025-01-06

## 2025-01-06 RX ADMIN — ONDANSETRON 4 MG: 4 TABLET, ORALLY DISINTEGRATING ORAL at 10:41

## 2025-01-06 NOTE — PROGRESS NOTES
[facility]        NAME: Evelyn Yeh is a 44 y.o. female  : 1980    MRN: [id number]  DATE: 2025  TIME: 11:14 AM    Assessment and Plan   Flu-like symptoms [R68.89]  1. Flu-like symptoms        2. Sore throat  POCT rapid strepA      3. Fever, unspecified fever cause  COVID/FLU    COVID/FLU      4. Nausea and vomiting, unspecified vomiting type  ondansetron (ZOFRAN-ODT) dispersible tablet 4 mg      5. Acute cough  benzonatate (TESSALON) 200 MG capsule        Rapid strep negative.  PCR COVID/flu obtained. Await results.     Patient Instructions     Check my chart for COVID/flu results.   Vitamin D3 2000 IU daily.  Vitamin C 1000mg twice per day.  Multivitamin daily.  Some studies suggest that Zinc 12.5-15mg every 2 hours while awake x 5 days may shorten the duration cold symptoms by 1-2 days.   Fluids and rest.  Nasal saline spray; Afrin if severe congestion (do not use for more than 3 days)  Over the counter cough/cold medications as needed.   Flonase nasal spray.  Tylenol/Ibuprofen for pain/fever.  Salt water gargles and/or chloraseptic spray.  Warm tea with honey.  Warm compresses over sinuses.  Nasal rinses with distilled water.     Follow up with PCP if symptoms persist past 10-14 days.  Proceed to the ER with worsening symptoms.     Chief Complaint     Chief Complaint   Patient presents with    Cold Like Symptoms     C/o cough, SOB, body aches, sore throat, h/a, fever of 102, and N/V.    Taking day quill and nyquill. Last dose of medication last night. Symptoms started on Sat.          History of Present Illness       The patient presents today with complaints of fever/chills (Tmax 102 at home), n/v, nasal congestion, body aches, dry cough, SOB, chest tightness, sore throat, headache that started on Sat. She has been taking dayquil/nyquil as needed with minimal relief, as well as tylenol/motrin. Last dose was around 3 am this morning.         Review of Systems   Review of  Systems   Constitutional:  Positive for appetite change (decreased), chills, fatigue and fever.   HENT:  Positive for congestion, ear pain (right), postnasal drip, rhinorrhea and sore throat. Negative for sinus pressure and sinus pain.    Respiratory:  Positive for cough and shortness of breath.    Gastrointestinal:  Positive for nausea and vomiting. Negative for abdominal pain and diarrhea.   Genitourinary:  Negative for difficulty urinating.   Musculoskeletal:  Positive for myalgias.   Skin:  Negative for rash.   Neurological:  Positive for headaches.         Current Medications       Current Outpatient Medications:     benzonatate (TESSALON) 200 MG capsule, Take 1 capsule (200 mg total) by mouth 3 (three) times a day as needed for cough, Disp: 20 capsule, Rfl: 0    lidocaine (LMX) 4 % cream, Apply topically daily at bedtime (Patient not taking: Reported on 2025), Disp: 60 g, Rfl: 1    naproxen (Naprosyn) 500 mg tablet, Take 1 tablet (500 mg total) by mouth 2 (two) times a day with meals (Patient not taking: Reported on 2025), Disp: 60 tablet, Rfl: 5    Wheat Dextrin (Benefiber) POWD, Take by mouth daily (Patient not taking: Reported on 2025), Disp: , Rfl:   No current facility-administered medications for this visit.    Current Allergies     Allergies as of 2025    (No Known Allergies)            The following portions of the patient's history were reviewed and updated as appropriate: allergies, current medications, past family history, past medical history, past social history, past surgical history and problem list.     History reviewed. No pertinent past medical history.    Past Surgical History:   Procedure Laterality Date     SECTION      CHOLECYSTECTOMY      TUBAL LIGATION         Family History   Problem Relation Age of Onset    Hypertension Mother     No Known Problems Daughter     No Known Problems Maternal Grandmother     No Known Problems Paternal Grandmother     No Known  Problems Paternal Aunt     Breast cancer Neg Hx          Medications have been verified.        Objective   /80   Pulse (!) 120   Temp (!) 101.6 °F (38.7 °C)   Resp 18   SpO2 96%        Physical Exam     Physical Exam  Vitals and nursing note reviewed.   Constitutional:       General: She is not in acute distress.     Appearance: Normal appearance. She is ill-appearing.   HENT:      Head: Normocephalic and atraumatic.      Right Ear: Tympanic membrane, ear canal and external ear normal.      Left Ear: Tympanic membrane, ear canal and external ear normal.      Nose: Congestion present. No rhinorrhea.      Mouth/Throat:      Lips: Pink.      Mouth: Mucous membranes are moist.      Pharynx: No oropharyngeal exudate or posterior oropharyngeal erythema.      Tonsils: No tonsillar exudate.   Eyes:      General: Vision grossly intact.      Extraocular Movements: Extraocular movements intact.      Pupils: Pupils are equal, round, and reactive to light.   Cardiovascular:      Rate and Rhythm: Regular rhythm. Tachycardia present.      Heart sounds: Normal heart sounds. No murmur heard.  Pulmonary:      Effort: Pulmonary effort is normal. No respiratory distress.      Breath sounds: Normal breath sounds. No decreased air movement. No decreased breath sounds, wheezing, rhonchi or rales.   Abdominal:      Palpations: Abdomen is soft.      Tenderness: There is no abdominal tenderness.   Musculoskeletal:         General: Normal range of motion.      Cervical back: Normal range of motion.   Lymphadenopathy:      Cervical: No cervical adenopathy.   Skin:     General: Skin is warm.      Findings: No rash.   Neurological:      Mental Status: She is alert and oriented to person, place, and time.      Motor: Motor function is intact.      Gait: Gait is intact.   Psychiatric:         Attention and Perception: Attention normal.         Mood and Affect: Mood normal.

## 2025-01-07 ENCOUNTER — RESULTS FOLLOW-UP (OUTPATIENT)
Dept: URGENT CARE | Facility: CLINIC | Age: 45
End: 2025-01-07

## 2025-01-07 LAB
FLUAV RNA RESP QL NAA+PROBE: POSITIVE
FLUBV RNA RESP QL NAA+PROBE: NEGATIVE
SARS-COV-2 RNA RESP QL NAA+PROBE: NEGATIVE

## 2025-01-07 NOTE — TELEPHONE ENCOUNTER
Call made to patient to discuss positive flu result. Discussed OTC supportive management. Patient expressed understanding and is in agreement with plan. All questions answered to patient satisfaction. Patient states she may follow up tomorrow for a work note if she still feels ill. Did discuss suspected symptom length with the flu and offered to provide work note in my chart since patient is still with a fever. She states she will stop by tomorrow 1/8/25.

## 2025-01-10 ENCOUNTER — TELEPHONE (OUTPATIENT)
Dept: URGENT CARE | Facility: CLINIC | Age: 45
End: 2025-01-10

## 2025-01-10 NOTE — LETTER
January 10, 2025     Patient: Evelyn Yeh  YOB: 1980  Date of Visit: 1/10/2025      To Whom it May Concern:    Evelyn was seen in my office on 1/10/2025. Evelyn should be excused 1/9 and 1/10/25.    If you have any questions or concerns, please don't hesitate to call.         Sincerely,          ANDREA Almanza        CC: No Recipients

## 2025-05-27 ENCOUNTER — OFFICE VISIT (OUTPATIENT)
Dept: URGENT CARE | Facility: MEDICAL CENTER | Age: 45
End: 2025-05-27
Payer: COMMERCIAL

## 2025-05-27 VITALS
BODY MASS INDEX: 29.81 KG/M2 | RESPIRATION RATE: 18 BRPM | DIASTOLIC BLOOD PRESSURE: 78 MMHG | OXYGEN SATURATION: 100 % | TEMPERATURE: 97.9 F | HEART RATE: 86 BPM | SYSTOLIC BLOOD PRESSURE: 138 MMHG | WEIGHT: 163 LBS

## 2025-05-27 DIAGNOSIS — J02.9 SORE THROAT: ICD-10-CM

## 2025-05-27 DIAGNOSIS — J01.90 ACUTE SINUSITIS, RECURRENCE NOT SPECIFIED, UNSPECIFIED LOCATION: Primary | ICD-10-CM

## 2025-05-27 LAB — S PYO AG THROAT QL: NEGATIVE

## 2025-05-27 PROCEDURE — 99213 OFFICE O/P EST LOW 20 MIN: CPT | Performed by: PHYSICIAN ASSISTANT

## 2025-05-27 PROCEDURE — 87880 STREP A ASSAY W/OPTIC: CPT | Performed by: PHYSICIAN ASSISTANT

## 2025-05-27 RX ORDER — BROMPHENIRAMINE MALEATE, PSEUDOEPHEDRINE HYDROCHLORIDE, AND DEXTROMETHORPHAN HYDROBROMIDE 2; 30; 10 MG/5ML; MG/5ML; MG/5ML
10 SYRUP ORAL 4 TIMES DAILY PRN
Qty: 120 ML | Refills: 0 | Status: SHIPPED | OUTPATIENT
Start: 2025-05-27

## 2025-05-27 RX ORDER — MULTIVITAMIN
1 TABLET ORAL DAILY
COMMUNITY

## 2025-05-27 NOTE — PROGRESS NOTES
[facility]        NAME: Evelyn Yeh is a 44 y.o. female  : 1980    MRN: [id number]  DATE: May 27, 2025  TIME: 8:24 PM    Assessment and Plan   Acute sinusitis, recurrence not specified, unspecified location [J01.90]  1. Acute sinusitis, recurrence not specified, unspecified location  amoxicillin-clavulanate (AUGMENTIN) 875-125 mg per tablet    brompheniramine-pseudoephedrine-DM 30-2-10 MG/5ML syrup      2. Sore throat  POCT rapid ANTIGEN strepA              The patient and/or parent/legal guardian verbalized understanding of exam findings and   Treatment plan. We engaged in the shared decision-making process and treatment options were   discussed at length with the patient.  All questions, concerns and complaints were answered and   addressed to the patient's' and/or parent/legal guardians's satisfaction.    Patient Instructions   There are no Patient Instructions on file for this visit.    Follow up with PCP in 3-5 days.  Proceed to  ER if symptoms worsen.    If tests are performed, our office will contact you with results only if   changes need to made to the care plan discussed with you at the visit.   [facility]     Chief Complaint     Chief Complaint   Patient presents with   • Cold Like Symptoms     Pt. With sore throat, runny nose, fever that began last night. States she has been congested for a month.          History of Present Illness       HPI  Patient presents with sore throat runny nose fever that began last night states she has been congested for a month.  Mucus is green/bloody, was clear until recently.     Review of Systems   Review of Systems  All other related systems reviewed with patient or accompanying historian and are negative except as noted in HPI    Current Medications     Current Medications[1]    Current Allergies     Allergies as of 2025   • (No Known Allergies)            The following portions of the patient's  "history were reviewed and updated as appropriate: allergies, current medications, past family history, past medical history, past social history, past surgical history and problem list.     Past Medical History[2]    Past Surgical History[3]    Family History[4]      Medications have been verified.        Objective   /78   Pulse 86   Temp 97.9 °F (36.6 °C)   Resp 18   Wt 73.9 kg (163 lb)   SpO2 100%   BMI 29.81 kg/m²   No LMP recorded.       Physical Exam     Physical Exam  Constitutional:       General: She is not in acute distress.     Appearance: She is well-developed. She is not diaphoretic.   HENT:      Head: Normocephalic and atraumatic.      Right Ear: Tympanic membrane normal.      Left Ear: Tympanic membrane normal.      Nose: Congestion present.     Eyes:      General: No scleral icterus.     Conjunctiva/sclera: Conjunctivae normal.     Neck:      Trachea: No tracheal deviation.     Cardiovascular:      Rate and Rhythm: Normal rate and regular rhythm.      Heart sounds: Normal heart sounds. No murmur heard.  Pulmonary:      Effort: Pulmonary effort is normal. No respiratory distress.      Breath sounds: Normal breath sounds. No stridor. No wheezing, rhonchi or rales.     Musculoskeletal:      Cervical back: Normal range of motion and neck supple.   Lymphadenopathy:      Cervical: No cervical adenopathy.     Skin:     General: Skin is warm and dry.      Findings: No erythema.     Neurological:      Mental Status: She is alert and oriented to person, place, and time.     Psychiatric:         Behavior: Behavior normal.         Ortho Exam        Procedures  No Procedures performed today        Note: Portions of this record may have been created with voice recognition software. Occasional wrong word or \"sound a like\" substitutions may have occurred due to the inherent limitations of voice recognition software. Please read the chart carefully and recognize, using context, where substitutions have " occurred.*             [1]    Current Outpatient Medications:   •  amoxicillin-clavulanate (AUGMENTIN) 875-125 mg per tablet, Take 1 tablet by mouth every 12 (twelve) hours for 10 days, Disp: 20 tablet, Rfl: 0  •  brompheniramine-pseudoephedrine-DM 30-2-10 MG/5ML syrup, Take 10 mL by mouth 4 (four) times a day as needed for congestion or cough, Disp: 120 mL, Rfl: 0  •  Multiple Vitamin (multivitamin) tablet, Take 1 tablet by mouth daily, Disp: , Rfl:   •  benzonatate (TESSALON) 200 MG capsule, Take 1 capsule (200 mg total) by mouth 3 (three) times a day as needed for cough (Patient not taking: Reported on 2025), Disp: 20 capsule, Rfl: 0  •  lidocaine (LMX) 4 % cream, Apply topically daily at bedtime (Patient not taking: Reported on 2025), Disp: 60 g, Rfl: 1  •  naproxen (Naprosyn) 500 mg tablet, Take 1 tablet (500 mg total) by mouth 2 (two) times a day with meals (Patient not taking: Reported on 2025), Disp: 60 tablet, Rfl: 5  •  Wheat Dextrin (Benefiber) POWD, Take by mouth daily (Patient not taking: Reported on 2025), Disp: , Rfl: [2]  No past medical history on file.[3]  Past Surgical History:  Procedure Laterality Date   •  SECTION     • CHOLECYSTECTOMY     • TUBAL LIGATION     [4]  Family History  Problem Relation Name Age of Onset   • Hypertension Mother [place]    • No Known Problems Daughter     • No Known Problems Maternal Grandmother     • No Known Problems Paternal Grandmother     • No Known Problems Paternal Aunt     • Breast cancer Neg Hx

## 2025-06-23 ENCOUNTER — APPOINTMENT (OUTPATIENT)
Dept: LAB | Facility: CLINIC | Age: 45
End: 2025-06-23
Attending: PREVENTIVE MEDICINE
Payer: COMMERCIAL

## 2025-06-23 ENCOUNTER — APPOINTMENT (OUTPATIENT)
Dept: LAB | Facility: CLINIC | Age: 45
End: 2025-06-23
Attending: PHYSICIAN ASSISTANT
Payer: COMMERCIAL

## 2025-06-23 ENCOUNTER — RESULTS FOLLOW-UP (OUTPATIENT)
Dept: FAMILY MEDICINE CLINIC | Facility: CLINIC | Age: 45
End: 2025-06-23

## 2025-06-23 DIAGNOSIS — Z00.8 HEALTH EXAMINATION IN POPULATION SURVEY: ICD-10-CM

## 2025-06-23 DIAGNOSIS — E03.9 ACQUIRED HYPOTHYROIDISM: Primary | ICD-10-CM

## 2025-06-23 DIAGNOSIS — Z13.29 SCREENING FOR THYROID DISORDER: ICD-10-CM

## 2025-06-23 LAB
ALBUMIN SERPL BCG-MCNC: 4.5 G/DL (ref 3.5–5)
ALP SERPL-CCNC: 52 U/L (ref 34–104)
ALT SERPL W P-5'-P-CCNC: 19 U/L (ref 7–52)
ANION GAP SERPL CALCULATED.3IONS-SCNC: 7 MMOL/L (ref 4–13)
AST SERPL W P-5'-P-CCNC: 16 U/L (ref 13–39)
BASOPHILS # BLD AUTO: 0.07 THOUSANDS/ÂΜL (ref 0–0.1)
BASOPHILS NFR BLD AUTO: 1 % (ref 0–1)
BILIRUB SERPL-MCNC: 0.4 MG/DL (ref 0.2–1)
BUN SERPL-MCNC: 17 MG/DL (ref 5–25)
CALCIUM SERPL-MCNC: 9.9 MG/DL (ref 8.4–10.2)
CHLORIDE SERPL-SCNC: 104 MMOL/L (ref 96–108)
CHOLEST SERPL-MCNC: 205 MG/DL (ref ?–200)
CO2 SERPL-SCNC: 28 MMOL/L (ref 21–32)
CREAT SERPL-MCNC: 0.6 MG/DL (ref 0.6–1.3)
EOSINOPHIL # BLD AUTO: 0.23 THOUSAND/ÂΜL (ref 0–0.61)
EOSINOPHIL NFR BLD AUTO: 3 % (ref 0–6)
ERYTHROCYTE [DISTWIDTH] IN BLOOD BY AUTOMATED COUNT: 12.8 % (ref 11.6–15.1)
EST. AVERAGE GLUCOSE BLD GHB EST-MCNC: 111 MG/DL
GFR SERPL CREATININE-BSD FRML MDRD: 111 ML/MIN/1.73SQ M
GLUCOSE P FAST SERPL-MCNC: 107 MG/DL (ref 65–99)
HBA1C MFR BLD: 5.5 %
HCT VFR BLD AUTO: 40.5 % (ref 34.8–46.1)
HDLC SERPL-MCNC: 53 MG/DL
HGB BLD-MCNC: 13.4 G/DL (ref 11.5–15.4)
IMM GRANULOCYTES # BLD AUTO: 0.03 THOUSAND/UL (ref 0–0.2)
IMM GRANULOCYTES NFR BLD AUTO: 0 % (ref 0–2)
LDLC SERPL CALC-MCNC: 100 MG/DL (ref 0–100)
LYMPHOCYTES # BLD AUTO: 1.94 THOUSANDS/ÂΜL (ref 0.6–4.47)
LYMPHOCYTES NFR BLD AUTO: 28 % (ref 14–44)
MCH RBC QN AUTO: 30.2 PG (ref 26.8–34.3)
MCHC RBC AUTO-ENTMCNC: 33.1 G/DL (ref 31.4–37.4)
MCV RBC AUTO: 91 FL (ref 82–98)
MONOCYTES # BLD AUTO: 0.49 THOUSAND/ÂΜL (ref 0.17–1.22)
MONOCYTES NFR BLD AUTO: 7 % (ref 4–12)
NEUTROPHILS # BLD AUTO: 4.11 THOUSANDS/ÂΜL (ref 1.85–7.62)
NEUTS SEG NFR BLD AUTO: 61 % (ref 43–75)
NONHDLC SERPL-MCNC: 152 MG/DL
NRBC BLD AUTO-RTO: 0 /100 WBCS
PLATELET # BLD AUTO: 293 THOUSANDS/UL (ref 149–390)
PMV BLD AUTO: 9.5 FL (ref 8.9–12.7)
POTASSIUM SERPL-SCNC: 4.3 MMOL/L (ref 3.5–5.3)
PROT SERPL-MCNC: 7.6 G/DL (ref 6.4–8.4)
RBC # BLD AUTO: 4.43 MILLION/UL (ref 3.81–5.12)
SODIUM SERPL-SCNC: 139 MMOL/L (ref 135–147)
T4 FREE SERPL-MCNC: 0.68 NG/DL (ref 0.61–1.12)
TRIGL SERPL-MCNC: 258 MG/DL (ref ?–150)
TSH SERPL DL<=0.05 MIU/L-ACNC: 8.71 UIU/ML (ref 0.45–4.5)
WBC # BLD AUTO: 6.87 THOUSAND/UL (ref 4.31–10.16)

## 2025-06-23 PROCEDURE — 84443 ASSAY THYROID STIM HORMONE: CPT

## 2025-06-23 PROCEDURE — 36415 COLL VENOUS BLD VENIPUNCTURE: CPT

## 2025-06-23 PROCEDURE — 80061 LIPID PANEL: CPT

## 2025-06-23 PROCEDURE — 84439 ASSAY OF FREE THYROXINE: CPT

## 2025-06-23 PROCEDURE — 83036 HEMOGLOBIN GLYCOSYLATED A1C: CPT

## 2025-06-24 RX ORDER — LEVOTHYROXINE SODIUM 25 UG/1
25 TABLET ORAL
Qty: 90 TABLET | Refills: 0 | Status: SHIPPED | OUTPATIENT
Start: 2025-06-24

## 2025-06-24 NOTE — TELEPHONE ENCOUNTER
----- Message from Jackie Willoughby PA-C sent at 6/24/2025 10:18 AM EDT -----  Thyroid  labs  show  patient has  mild  underactive  thyroid.  To  start levothyroxine 25 mcg in  am  by itself.  Repeat thyroid  labs  ordered   to  complete in 6 -8 weeks.  Need  name  of  pharmacy    to  send  meds  ----- Message -----  From: Lab, Background User  Sent: 6/23/2025   7:40 AM EDT  To: Jackie Willoughby PA-C

## 2025-06-24 NOTE — TELEPHONE ENCOUNTER
FYI:    Pt called back regarding lab results, I relay information, pt is aware.    Pt would like to have medication sent to the following pharm:    [id number] - [place] - [phone]     Pt would like a return call to further discuss abnormal labs.     Please advise

## 2025-08-08 PROBLEM — E03.9 ACQUIRED HYPOTHYROIDISM: Status: ACTIVE | Noted: 2025-08-08

## 2025-08-08 PROBLEM — L24.9 IRRITANT CONTACT DERMATITIS: Status: RESOLVED | Noted: 2024-07-17 | Resolved: 2025-08-08

## 2025-08-08 PROBLEM — K63.9 BOWEL TROUBLE: Status: RESOLVED | Noted: 2024-06-26 | Resolved: 2025-08-08

## 2025-08-08 PROBLEM — M89.8X1 PAIN OF RIGHT SCAPULA: Status: RESOLVED | Noted: 2024-06-26 | Resolved: 2025-08-08

## 2025-08-12 ENCOUNTER — APPOINTMENT (OUTPATIENT)
Dept: LAB | Facility: CLINIC | Age: 45
End: 2025-08-12
Attending: PHYSICIAN ASSISTANT
Payer: COMMERCIAL

## 2025-08-12 LAB — TSH SERPL DL<=0.05 MIU/L-ACNC: 5.04 UIU/ML (ref 0.45–4.5)

## 2025-08-13 ENCOUNTER — OFFICE VISIT (OUTPATIENT)
Dept: FAMILY MEDICINE CLINIC | Facility: CLINIC | Age: 45
End: 2025-08-13
Payer: COMMERCIAL